# Patient Record
Sex: FEMALE | Race: WHITE | NOT HISPANIC OR LATINO | Employment: FULL TIME | URBAN - METROPOLITAN AREA
[De-identification: names, ages, dates, MRNs, and addresses within clinical notes are randomized per-mention and may not be internally consistent; named-entity substitution may affect disease eponyms.]

---

## 2020-09-30 ENCOUNTER — OFFICE VISIT - HEALTHEAST (OUTPATIENT)
Dept: FAMILY MEDICINE | Facility: CLINIC | Age: 55
End: 2020-09-30

## 2020-09-30 DIAGNOSIS — S61.210A LACERATION OF RIGHT INDEX FINGER WITHOUT FOREIGN BODY WITHOUT DAMAGE TO NAIL, INITIAL ENCOUNTER: ICD-10-CM

## 2020-09-30 RX ORDER — LEVOTHYROXINE SODIUM 100 UG/1
TABLET ORAL
Status: SHIPPED | COMMUNITY
Start: 2020-05-26

## 2021-05-25 ENCOUNTER — RECORDS - HEALTHEAST (OUTPATIENT)
Dept: ADMINISTRATIVE | Facility: CLINIC | Age: 56
End: 2021-05-25

## 2021-05-27 VITALS
OXYGEN SATURATION: 97 % | HEART RATE: 87 BPM | DIASTOLIC BLOOD PRESSURE: 82 MMHG | RESPIRATION RATE: 10 BRPM | SYSTOLIC BLOOD PRESSURE: 122 MMHG

## 2021-06-18 NOTE — PATIENT INSTRUCTIONS - HE
Patient Instructions by Chaya Mujica PA-C at 9/30/2020  7:40 AM     Author: Chaya Mujica PA-C Service: -- Author Type: Physician Assistant    Filed: 9/30/2020  7:52 AM Encounter Date: 9/30/2020 Status: Signed    : Chaya Mujica PA-C (Physician Assistant)       Patient Education     Extremity Laceration: Stitches, Staples, or Tape  A laceration is a cut through the skin. If it is deep, it may require stitches or staples to close so it can heal. Minor cuts may be treated with surgical tape closures, or skin glue.  X-rays may be done if something may have entered the skin through the cut. You may also need a tetanus shot if you are not up to date on this vaccine.  Home care    Follow the healthcare providers instructions on how to care for the cut.    Wash your hands with soap and warm water before and after caring for your wound. This is to help prevent infection.    Keep the wound clean and dry. If a bandage was applied and it becomes wet or dirty, replace it. Otherwise, leave it in place for the first 24 hours, then change it once a day or as directed.    If stitches or staples were used, clean the wound daily:  ? After removing the bandage, wash the area with soap and water. Use a wet cotton swab to loosen and remove any blood or crust that forms.  ? After cleaning, keep the wound clean and dry. Talk with your healthcare provider before putting any antibiotic ointment on the wound. Reapply the bandage.    You may remove the bandage to shower as usual after the first 24 hours, but don't soak the area in water (no swimming) until the stitches or staples are removed.    If surgical tape closures were used, keep the area clean and dry. If it becomes wet, blot it dry with a towel. Let the surgical tape fall off on its own.    The healthcare provider may prescribe an antibiotic cream or ointment to prevent infection. He or she may also prescribe an antibiotic pill. Don't stop  taking this medicine until you have finished it all or the provider tells you to stop.    The provider may also prescribe medicine for pain. Follow the instructions for taking these medicines.    Don't do activities that may reopen your wound.  Follow-up care  Follow up with your healthcare provider, or as advised. Most skin wounds heal within 10 days. But an infection may sometimes occur even with proper treatment. Check the wound daily for the signs of infection listed below. Stitches and staples should be removed within 7 to14 days. If surgical tape closures were used, you may remove them after 10 days if they have not fallen off by then.   When to seek medical advice  Call your healthcare provider right away if any of these occur:    Wound bleeding not controlled by direct pressure    Signs of infection, including increasing pain in the wound, increasing wound redness or swelling, or pus or bad odor coming from the wound    Fever of 100.4 F (38 C) or higher, or as directed by your healthcare provider    Stitches or staples come apart or fall out or surgical tape falls off before 7 days    Wound edges reopen    Wound changes colors    Numbness occurs around the wound     Decreased movement around the injured area  Date Last Reviewed: 7/1/2017 2000-2017 [employer]. [address] [place] [address], [place] [address]. All rights reserved. This information is not intended as a substitute for professional medical care. Always follow your healthcare professional's instructions.

## 2021-06-29 NOTE — PROGRESS NOTES
Progress Notes by Chaya Mujica PA-C at 9/30/2020  7:40 AM     Author: Chaya Mujica PA-C Service: -- Author Type: Physician Assistant    Filed: 9/30/2020  8:19 AM Encounter Date: 9/30/2020 Status: Signed    : Chyaa Mujica PA-C (Physician Assistant)       Chief Complaint   Patient presents with   ? Laceration     patient cut finger this morning R hand pointer finger side of knuckle      HPI:  Sylwia Phelps is a 55 y.o. female who complains of laceration to R index finger onset 30 mins ago. Pt dropped a glass which broke into 3 large pieces and when she tried to catch it she cut her finger on the edge of one of the pieces. She denies the glass breaking into smaller pieces. Symptoms are moderate in severity & constant in duration. Denies fever/chills, purulent drainage, warmth, erythema, motor deficit, numbness/tingling, or any other symptoms. Last tetanus immunization unknown.     Problem List:  There are no relevant problems documented for this patient.    No past medical history on file.  Social History     Tobacco Use   ? Smoking status: Never Smoker   ? Smokeless tobacco: Never Used   Substance Use Topics   ? Alcohol use: Not on file     Review of Systems   Constitutional: Negative for chills and fever.   Respiratory: Negative for shortness of breath.    Cardiovascular: Negative for chest pain.   Gastrointestinal: Negative for abdominal pain, diarrhea, nausea and vomiting.   Skin: Positive for wound. Negative for rash.   All other systems reviewed and are negative.    Vitals:    09/30/20 0738   BP: 122/82   Pulse: 87   Resp: 10   SpO2: 97%     Physical Exam   Constitutional: She appears well-developed and well-nourished.  Non-toxic appearance.   HENT:   Head: Normocephalic and atraumatic.   Cardiovascular: Normal rate, regular rhythm and normal heart sounds.   Pulses:       Radial pulses are 2+ on the left side.   Pulmonary/Chest: Effort normal and breath sounds  normal.   Musculoskeletal:      Right hand: She exhibits tenderness (wound only) and laceration. She exhibits normal range of motion and no swelling.        Hands:    Neurological: She is alert.   Skin: Skin is warm and dry. Laceration noted.   Psychiatric: She has a normal mood and affect.     Labs:  No results found for this or any previous visit (from the past 24 hour(s)).    Radiology:  No results found.    Clinical Decision Making:  LACERATION REPAIR:   Laceration LOCATION: R index finger  Size of laceration: 2.5 centimeters  Tendon function, sensation intact. Good pulses, cap refill intact. Good range of motion.  Wound clean. No Foreign body noted.   Oral consent received. Patient aware of risks which include but are not limited to infection, bleeding, iatrogenic injury. Alternative treatment plan was also discussed.  The wound area was irrigated with sterile saline, cleansed with povidone iodine and draped in a sterile fashion.  The wound area was anesthetized with Lidocaine 2% without epinephrine without added sodium bicarbonate.  The wound was repaired with 4-0 Nylon; 5 sutures were used.  Wound care discussed.  Tetanus immunization given.  Suture removal in 10 days.    Dressing applied. Patient tolerated procedure well. Splint not indicated as laceration does not cross over any joints.   Advised to watch for signs or symptoms of infection. If with any concerns of infection advised to come in immediately to be reassessed.         At the end of the encounter, I discussed results, diagnosis, medications. Discussed red flags for immediate return to clinic/ER, as well as indications for follow up if no improvement. Patient understood and agreed to plan. Patient was stable for discharge.  1. Laceration of right index finger without foreign body without damage to nail, initial encounter  Tdap vaccine,  8yo or older,  IM    Laceration repair     Return in about 10 days (around 10/10/2020) for suture  removal.  ?  AYALA MontielA, PAChioC  [facility]

## 2023-08-16 ENCOUNTER — APPOINTMENT (OUTPATIENT)
Dept: RADIOLOGY | Facility: HOSPITAL | Age: 58
End: 2023-08-16
Attending: EMERGENCY MEDICINE
Payer: COMMERCIAL

## 2023-08-16 ENCOUNTER — HOSPITAL ENCOUNTER (EMERGENCY)
Facility: HOSPITAL | Age: 58
Discharge: HOME | End: 2023-08-16
Attending: EMERGENCY MEDICINE | Admitting: EMERGENCY MEDICINE
Payer: COMMERCIAL

## 2023-08-16 VITALS
SYSTOLIC BLOOD PRESSURE: 123 MMHG | OXYGEN SATURATION: 100 % | TEMPERATURE: 98.2 F | RESPIRATION RATE: 18 BRPM | HEART RATE: 72 BPM | DIASTOLIC BLOOD PRESSURE: 82 MMHG

## 2023-08-16 DIAGNOSIS — R07.9 CHEST PAIN, UNSPECIFIED TYPE: ICD-10-CM

## 2023-08-16 LAB
ALBUMIN SERPL BCG-MCNC: 4.8 G/DL (ref 3.5–5.2)
ALP SERPL-CCNC: 43 U/L (ref 35–104)
ALT SERPL W P-5'-P-CCNC: 15 U/L (ref 0–50)
ANION GAP SERPL CALCULATED.3IONS-SCNC: 12 MMOL/L (ref 7–15)
AST SERPL W P-5'-P-CCNC: 26 U/L (ref 0–45)
BASOPHILS # BLD AUTO: 0.1 10E3/UL (ref 0–0.2)
BASOPHILS NFR BLD AUTO: 1 %
BILIRUB DIRECT SERPL-MCNC: <0.2 MG/DL (ref 0–0.3)
BILIRUB SERPL-MCNC: 0.5 MG/DL
BUN SERPL-MCNC: 11.4 MG/DL (ref 6–20)
CALCIUM SERPL-MCNC: 10 MG/DL (ref 8.6–10)
CHLORIDE SERPL-SCNC: 105 MMOL/L (ref 98–107)
CREAT SERPL-MCNC: 0.94 MG/DL (ref 0.51–0.95)
DEPRECATED HCO3 PLAS-SCNC: 24 MMOL/L (ref 22–29)
EOSINOPHIL # BLD AUTO: 0.1 10E3/UL (ref 0–0.7)
EOSINOPHIL NFR BLD AUTO: 1 %
ERYTHROCYTE [DISTWIDTH] IN BLOOD BY AUTOMATED COUNT: 12.9 % (ref 10–15)
GFR SERPL CREATININE-BSD FRML MDRD: 71 ML/MIN/1.73M2
GLUCOSE SERPL-MCNC: 100 MG/DL (ref 70–99)
HCT VFR BLD AUTO: 40.3 % (ref 35–47)
HGB BLD-MCNC: 13.1 G/DL (ref 11.7–15.7)
HOLD SPECIMEN: NORMAL
IMM GRANULOCYTES # BLD: 0 10E3/UL
IMM GRANULOCYTES NFR BLD: 0 %
LIPASE SERPL-CCNC: 22 U/L (ref 13–60)
LYMPHOCYTES # BLD AUTO: 1.4 10E3/UL (ref 0.8–5.3)
LYMPHOCYTES NFR BLD AUTO: 19 %
MAGNESIUM SERPL-MCNC: 2 MG/DL (ref 1.7–2.3)
MCH RBC QN AUTO: 29.6 PG (ref 26.5–33)
MCHC RBC AUTO-ENTMCNC: 32.5 G/DL (ref 31.5–36.5)
MCV RBC AUTO: 91 FL (ref 78–100)
MONOCYTES # BLD AUTO: 0.4 10E3/UL (ref 0–1.3)
MONOCYTES NFR BLD AUTO: 5 %
NEUTROPHILS # BLD AUTO: 5.2 10E3/UL (ref 1.6–8.3)
NEUTROPHILS NFR BLD AUTO: 74 %
NRBC # BLD AUTO: 0 10E3/UL
NRBC BLD AUTO-RTO: 0 /100
PLATELET # BLD AUTO: 393 10E3/UL (ref 150–450)
POTASSIUM SERPL-SCNC: 4.1 MMOL/L (ref 3.4–5.3)
PROT SERPL-MCNC: 7.8 G/DL (ref 6.4–8.3)
RBC # BLD AUTO: 4.43 10E6/UL (ref 3.8–5.2)
SODIUM SERPL-SCNC: 141 MMOL/L (ref 136–145)
TROPONIN T SERPL HS-MCNC: 6 NG/L
WBC # BLD AUTO: 7.1 10E3/UL (ref 4–11)

## 2023-08-16 PROCEDURE — 84484 ASSAY OF TROPONIN QUANT: CPT | Performed by: EMERGENCY MEDICINE

## 2023-08-16 PROCEDURE — 85025 COMPLETE CBC W/AUTO DIFF WBC: CPT | Performed by: EMERGENCY MEDICINE

## 2023-08-16 PROCEDURE — 96375 TX/PRO/DX INJ NEW DRUG ADDON: CPT

## 2023-08-16 PROCEDURE — 93005 ELECTROCARDIOGRAM TRACING: CPT | Performed by: EMERGENCY MEDICINE

## 2023-08-16 PROCEDURE — 93005 ELECTROCARDIOGRAM TRACING: CPT | Performed by: STUDENT IN AN ORGANIZED HEALTH CARE EDUCATION/TRAINING PROGRAM

## 2023-08-16 PROCEDURE — 99285 EMERGENCY DEPT VISIT HI MDM: CPT | Mod: 25

## 2023-08-16 PROCEDURE — 250N000011 HC RX IP 250 OP 636: Mod: JZ | Performed by: EMERGENCY MEDICINE

## 2023-08-16 PROCEDURE — 83735 ASSAY OF MAGNESIUM: CPT | Performed by: EMERGENCY MEDICINE

## 2023-08-16 PROCEDURE — 36415 COLL VENOUS BLD VENIPUNCTURE: CPT | Performed by: EMERGENCY MEDICINE

## 2023-08-16 PROCEDURE — 96374 THER/PROPH/DIAG INJ IV PUSH: CPT

## 2023-08-16 PROCEDURE — 82248 BILIRUBIN DIRECT: CPT | Performed by: EMERGENCY MEDICINE

## 2023-08-16 PROCEDURE — 83690 ASSAY OF LIPASE: CPT | Performed by: EMERGENCY MEDICINE

## 2023-08-16 PROCEDURE — 71045 X-RAY EXAM CHEST 1 VIEW: CPT

## 2023-08-16 PROCEDURE — 96361 HYDRATE IV INFUSION ADD-ON: CPT

## 2023-08-16 PROCEDURE — 258N000003 HC RX IP 258 OP 636: Performed by: EMERGENCY MEDICINE

## 2023-08-16 PROCEDURE — 80053 COMPREHEN METABOLIC PANEL: CPT | Performed by: EMERGENCY MEDICINE

## 2023-08-16 RX ORDER — KETOROLAC TROMETHAMINE 15 MG/ML
15 INJECTION, SOLUTION INTRAMUSCULAR; INTRAVENOUS ONCE
Status: COMPLETED | OUTPATIENT
Start: 2023-08-16 | End: 2023-08-16

## 2023-08-16 RX ORDER — NAPROXEN 500 MG/1
500 TABLET ORAL 2 TIMES DAILY WITH MEALS
Qty: 14 TABLET | Refills: 0 | Status: SHIPPED | OUTPATIENT
Start: 2023-08-16 | End: 2023-08-23

## 2023-08-16 RX ORDER — LIDOCAINE 50 MG/G
1 PATCH TOPICAL EVERY 24 HOURS
Qty: 10 PATCH | Refills: 0 | Status: SHIPPED | OUTPATIENT
Start: 2023-08-16 | End: 2023-08-26

## 2023-08-16 RX ORDER — ONDANSETRON 2 MG/ML
4 INJECTION INTRAMUSCULAR; INTRAVENOUS
Status: COMPLETED | OUTPATIENT
Start: 2023-08-16 | End: 2023-08-16

## 2023-08-16 RX ADMIN — SODIUM CHLORIDE 500 ML: 9 INJECTION, SOLUTION INTRAVENOUS at 12:40

## 2023-08-16 RX ADMIN — ONDANSETRON 4 MG: 2 INJECTION INTRAMUSCULAR; INTRAVENOUS at 12:40

## 2023-08-16 RX ADMIN — KETOROLAC TROMETHAMINE 15 MG: 15 INJECTION, SOLUTION INTRAMUSCULAR; INTRAVENOUS at 12:39

## 2023-08-16 ASSESSMENT — ACTIVITIES OF DAILY LIVING (ADL): ADLS_ACUITY_SCORE: 35

## 2023-08-16 NOTE — DISCHARGE INSTRUCTIONS
Follow up with rapid access cardiology clinic - they will call you for appointment.  Take naproxen twice a day with food and lidoderm patches daily as needed. Follow up with primary clinic if not improving over the next few weeks.

## 2023-08-16 NOTE — ED PROVIDER NOTES
Emergency Department Encounter     Evaluation Date & Time:   8/16/2023 11:51 AM    CHIEF COMPLAINT:  Chest Pain      Triage Note:      Pt with c/o chest pain that started yesterday.  Pt reports pain continued today while at work.  Left sided cp that radiates to left arm and describes as achy/gnawing pain.  Some nausea.                ED COURSE & MEDICAL DECISION MAKING:     ED Course as of 08/16/23 1449   Wed Aug 16, 2023   1155 Met with patient for initial interview and exam. Her friend is at bedside.   1325 hsTrop 6, which is below gender reference range with symptoms ongoing for past 2 days, ACS ruled out per protocol. Rest of labs all reassuring as well.   1333 CXR (independent interpretation): no acute cardiopulmonary process    1352 Updated patient.  Re-evaluated, reassured by workup. Discussed nsdaids, lidoderm patch. Rx for both.  Encouraged follow up if not improving over the next few weeks and will refer to rapid access cardiology given age and symptoms.         Pt here with left sided chest pressure/achiness past 2 days, constant with waxing/waning severity. Reports associated lightheadedness, HA, nausea. Denies dyspnea, syncope, leg pain/swelling or prior DVT/PE history.  Pt with no cardiac history.  She has some reproducible tenderness left axilla where pain radiates, but no palpable mass or rash seen.  EKG unremarkable.  Will get labs, CXR, reassess.  Intact pulses, no real suspicion for aortic pathology or PE and will not get CTA chest at this time.      Medical Decision Making    History:  Supplemental history from: Documented in chart, if applicable  External Record(s) reviewed: Outpatient Record: 03/13/2023 Office Visit    Work Up:  Chart documentation includes differential considered and any EKGs or imaging independently interpreted by provider, where specified.  In additional to work up documented, I considered the following work up: Documented in chart, if applicable.    External  consultation:  Discussion of management with another provider: Documented in chart, if applicable    Complicating factors:  Care impacted by chronic illness: Other: Hypothyroidism  Care affected by social determinants of health: N/A    Disposition considerations: Discharge. I prescribed additional prescription strength medication(s) as charted. See documentation for any additional details.      At the conclusion of the encounter I discussed the results of all the tests and the disposition. The questions were answered. The patient or family acknowledged understanding and was agreeable with the care plan.      MEDICATIONS GIVEN IN THE EMERGENCY DEPARTMENT:  Medications   ketorolac (TORADOL) injection 15 mg (15 mg Intravenous $Given 8/16/23 1239)   ondansetron (ZOFRAN) injection 4 mg (4 mg Intravenous $Given 8/16/23 1240)   0.9% sodium chloride BOLUS (0 mLs Intravenous Stopped 8/16/23 1402)       NEW PRESCRIPTIONS STARTED AT TODAY'S ED VISIT:  Discharge Medication List as of 8/16/2023  2:03 PM        START taking these medications    Details   lidocaine (LIDODERM) 5 % patch Place 1 patch onto the skin every 24 hours for 10 days Apply to area of painDisp-10 patch, [id number]      naproxen (NAPROSYN) 500 MG tablet Take 1 tablet (500 mg) by mouth 2 times daily (with meals) for 7 days, Disp-14 tablet, R-0, E-Prescribe             HPI   The history is provided by the patient and a friend. No  was used.        Sylwia Phelps is a 58 year old female with a pertinent history of hypothyroidism who presents to this ED by walk in for evaluation of chest pain.    Per chart review, the patient was seen for an Office Visit on 03/13/2023 for evaluation of neck pain and occipital pain. The pain was in her right lateral neck and radiated up and to the left side. She was encouraged to schedule imaging, which showed mild to moderate midline C5-C6 and C6-C7 spinal canal stenosis, moderate to severe right C5-C6  "neural foraminal stenosis, mild C5-C6 and C6-C7 degenerative disc disease.     The patient reports left-sided chest pain for the past couple days that is \"gnawing\" and radiates into her armpit. She endorses a \"squeezing\" headache, lightheadedness, and nausea. The nausea is intermittent and is associated with the lightheadedness. The patient reports some jaw ache on her left side and pain down her upper left arm. She states that nothing alleviates or provokes the pain. It gets worse and better, but does not fully go away.    She denies shortness of breath, vomiting, abdominal pain, cough, and fever. She denies recent surgery or travel, chance of pregnancy, and any history of blood clot.    REVIEW OF SYSTEMS:  See HPI      Medical History   No past medical history on file.    No past surgical history on file.    No family history on file.    Social History     Tobacco Use    Smoking status: Never    Smokeless tobacco: Never       lidocaine (LIDODERM) 5 % patch  naproxen (NAPROSYN) 500 MG tablet  levothyroxine (SYNTHROID) 100 MCG tablet        Physical Exam     Vitals:  /82   Pulse 72   Temp 98.2  F (36.8  C)   Resp 18   SpO2 100%     PHYSICAL EXAM:   Physical Exam  Vitals and nursing note reviewed.   Constitutional:       General: She is not in acute distress.     Appearance: Normal appearance.   HENT:      Head: Normocephalic and atraumatic.      Nose: Nose normal.      Mouth/Throat:      Mouth: Mucous membranes are moist.   Eyes:      Pupils: Pupils are equal, round, and reactive to light.   Neck:      Vascular: No JVD.   Cardiovascular:      Rate and Rhythm: Normal rate and regular rhythm.      Pulses: Normal pulses.           Radial pulses are 2+ on the right side and 2+ on the left side.        Dorsalis pedis pulses are 2+ on the right side and 2+ on the left side.   Pulmonary:      Effort: Pulmonary effort is normal. No respiratory distress.      Breath sounds: Normal breath sounds.   Chest:      Chest " wall: No tenderness.   Abdominal:      Palpations: Abdomen is soft.      Tenderness: There is no abdominal tenderness.   Musculoskeletal:      Cervical back: Full passive range of motion without pain and neck supple.      Comments: No calf tenderness or swelling b/l  Mild left axillary tenderness, no palpable mass.    Skin:     General: Skin is warm.      Findings: No rash (to chest wall or left axilla).   Neurological:      General: No focal deficit present.      Mental Status: She is alert. Mental status is at baseline.      Comments: Fluent speech, no acute lateralizing deficits   Psychiatric:         Mood and Affect: Mood normal.         Behavior: Behavior normal.         Results     LAB:  All pertinent labs reviewed and interpreted  Labs Ordered and Resulted from Time of ED Arrival to Time of ED Departure   BASIC METABOLIC PANEL - Abnormal       Result Value    Sodium 141      Potassium 4.1      Chloride 105      Carbon Dioxide (CO2) 24      Anion Gap 12      Urea Nitrogen 11.4      Creatinine 0.94      Calcium 10.0      Glucose 100 (*)     GFR Estimate 71     HEPATIC FUNCTION PANEL - Normal    Protein Total 7.8      Albumin 4.8      Bilirubin Total 0.5      Alkaline Phosphatase 43      AST 26      ALT 15      Bilirubin Direct <0.20     LIPASE - Normal    Lipase 22     TROPONIN T, HIGH SENSITIVITY - Normal    Troponin T, High Sensitivity 6     MAGNESIUM - Normal    Magnesium 2.0     CBC WITH PLATELETS AND DIFFERENTIAL    WBC Count 7.1      RBC Count 4.43      Hemoglobin 13.1      Hematocrit 40.3      MCV 91      MCH 29.6      MCHC 32.5      RDW 12.9      Platelet Count 393      % Neutrophils 74      % Lymphocytes 19      % Monocytes 5      % Eosinophils 1      % Basophils 1      % Immature Granulocytes 0      NRBCs per 100 WBC 0      Absolute Neutrophils 5.2      Absolute Lymphocytes 1.4      Absolute Monocytes 0.4      Absolute Eosinophils 0.1      Absolute Basophils 0.1      Absolute Immature Granulocytes 0.0       Absolute NRBCs 0.0         RADIOLOGY:  XR Chest Port 1 View   Final Result   IMPRESSION: Negative chest.                   ECG:  NSR, rate 78, normal intervals, no acute ischemia    I have independently reviewed and interpreted the EKG(s) documented above     PROCEDURES:  Procedures:  None      FINAL IMPRESSION:    ICD-10-CM    1. Chest pain, unspecified type  R07.9 [facility]  Referral          0 minutes of critical care time      I, Zeeshan Ortiz, am serving as a scribe to document services personally performed by Dr. Kenneth Bronson, based on my observations and the provider's statements to me. I, Kenneth Bronson, DO attest that Zeeshan Ortiz is acting in a scribe capacity, has observed my performance of the services and has documented them in accordance with my direction.      Kenneth Bronson DO  Emergency Medicine  [facility] EMERGENCY DEPARTMENT  8/16/2023  11:53 AM          Kenneth Bronson MD  08/16/23 5026

## 2023-08-16 NOTE — ED NOTES
Pt presents for evaluation of CP with radiation down her L arm, as well as head pressure which has been ongoing since yesterday. No reported hx of similar episodes.   SKIN: WNL.   HEENT: Normocephalic, alert and oriented x 3.   CHEST: Symmetrical rise. No SOB. L sided chest discomfort.   ABD: No reported vomiting. Pt endorses some nausea.   EXT: BUI x 4  Rest of exam unremarkable.

## 2023-08-16 NOTE — ED TRIAGE NOTES
Pt with c/o chest pain that started yesterday.  Pt reports pain continued today while at work.  Left sided cp that radiates to left arm and describes as achy/gnawing pain.  Some nausea.

## 2023-08-21 LAB
ATRIAL RATE - MUSE: 78 BPM
DIASTOLIC BLOOD PRESSURE - MUSE: NORMAL MMHG
INTERPRETATION ECG - MUSE: NORMAL
P AXIS - MUSE: 10 DEGREES
PR INTERVAL - MUSE: 126 MS
QRS DURATION - MUSE: 80 MS
QT - MUSE: 404 MS
QTC - MUSE: 460 MS
R AXIS - MUSE: 29 DEGREES
SYSTOLIC BLOOD PRESSURE - MUSE: NORMAL MMHG
T AXIS - MUSE: 15 DEGREES
VENTRICULAR RATE- MUSE: 78 BPM

## 2024-12-18 ENCOUNTER — HOSPITAL ENCOUNTER (EMERGENCY)
Facility: HOSPITAL | Age: 59
Discharge: HOME | End: 2024-12-18
Attending: EMERGENCY MEDICINE | Admitting: EMERGENCY MEDICINE
Payer: COMMERCIAL

## 2024-12-18 VITALS
RESPIRATION RATE: 16 BRPM | TEMPERATURE: 98.3 F | HEART RATE: 71 BPM | OXYGEN SATURATION: 100 % | WEIGHT: 227.07 LBS | SYSTOLIC BLOOD PRESSURE: 124 MMHG | DIASTOLIC BLOOD PRESSURE: 61 MMHG

## 2024-12-18 DIAGNOSIS — R07.9 CHEST PAIN, UNSPECIFIED TYPE: ICD-10-CM

## 2024-12-18 DIAGNOSIS — R20.2 PARESTHESIAS: ICD-10-CM

## 2024-12-18 LAB
ANION GAP SERPL CALCULATED.3IONS-SCNC: 12 MMOL/L (ref 7–15)
BASOPHILS # BLD AUTO: 0.1 10E3/UL (ref 0–0.2)
BASOPHILS NFR BLD AUTO: 1 %
BUN SERPL-MCNC: 13.4 MG/DL (ref 6–20)
CALCIUM SERPL-MCNC: 9.9 MG/DL (ref 8.8–10.4)
CHLORIDE SERPL-SCNC: 106 MMOL/L (ref 98–107)
CREAT SERPL-MCNC: 0.83 MG/DL (ref 0.51–0.95)
D DIMER PPP FEU-MCNC: 1.34 UG/ML FEU (ref 0–0.5)
EGFRCR SERPLBLD CKD-EPI 2021: 82 ML/MIN/1.73M2
EOSINOPHIL # BLD AUTO: 0.2 10E3/UL (ref 0–0.7)
EOSINOPHIL NFR BLD AUTO: 2 %
ERYTHROCYTE [DISTWIDTH] IN BLOOD BY AUTOMATED COUNT: 12.7 % (ref 10–15)
GLUCOSE SERPL-MCNC: 100 MG/DL (ref 70–99)
HCO3 SERPL-SCNC: 24 MMOL/L (ref 22–29)
HCT VFR BLD AUTO: 41.2 % (ref 35–47)
HGB BLD-MCNC: 13.4 G/DL (ref 11.7–15.7)
IMM GRANULOCYTES # BLD: 0 10E3/UL
IMM GRANULOCYTES NFR BLD: 0 %
LYMPHOCYTES # BLD AUTO: 2.3 10E3/UL (ref 0.8–5.3)
LYMPHOCYTES NFR BLD AUTO: 25 %
MCH RBC QN AUTO: 28.8 PG (ref 26.5–33)
MCHC RBC AUTO-ENTMCNC: 32.5 G/DL (ref 31.5–36.5)
MCV RBC AUTO: 88 FL (ref 78–100)
MONOCYTES # BLD AUTO: 0.5 10E3/UL (ref 0–1.3)
MONOCYTES NFR BLD AUTO: 6 %
NEUTROPHILS # BLD AUTO: 6 10E3/UL (ref 1.6–8.3)
NEUTROPHILS NFR BLD AUTO: 66 %
NRBC # BLD AUTO: 0 10E3/UL
NRBC BLD AUTO-RTO: 0 /100
PLATELET # BLD AUTO: 401 10E3/UL (ref 150–450)
POTASSIUM SERPL-SCNC: 3.8 MMOL/L (ref 3.4–5.3)
RBC # BLD AUTO: 4.66 10E6/UL (ref 3.8–5.2)
SODIUM SERPL-SCNC: 142 MMOL/L (ref 135–145)
TROPONIN T SERPL HS-MCNC: 6 NG/L
WBC # BLD AUTO: 9 10E3/UL (ref 4–11)

## 2024-12-18 PROCEDURE — 82565 ASSAY OF CREATININE: CPT | Performed by: EMERGENCY MEDICINE

## 2024-12-18 PROCEDURE — A9585 GADOBUTROL INJECTION: HCPCS | Performed by: EMERGENCY MEDICINE

## 2024-12-18 PROCEDURE — 84484 ASSAY OF TROPONIN QUANT: CPT | Performed by: EMERGENCY MEDICINE

## 2024-12-18 PROCEDURE — 85025 COMPLETE CBC W/AUTO DIFF WBC: CPT | Performed by: EMERGENCY MEDICINE

## 2024-12-18 PROCEDURE — 36415 COLL VENOUS BLD VENIPUNCTURE: CPT | Performed by: EMERGENCY MEDICINE

## 2024-12-18 PROCEDURE — 99285 EMERGENCY DEPT VISIT HI MDM: CPT | Mod: 25

## 2024-12-18 PROCEDURE — 80048 BASIC METABOLIC PNL TOTAL CA: CPT | Performed by: EMERGENCY MEDICINE

## 2024-12-18 PROCEDURE — 85379 FIBRIN DEGRADATION QUANT: CPT | Performed by: EMERGENCY MEDICINE

## 2024-12-18 PROCEDURE — 255N000002 HC RX 255 OP 636: Performed by: EMERGENCY MEDICINE

## 2024-12-18 PROCEDURE — 93005 ELECTROCARDIOGRAM TRACING: CPT | Performed by: STUDENT IN AN ORGANIZED HEALTH CARE EDUCATION/TRAINING PROGRAM

## 2024-12-18 PROCEDURE — 250N000011 HC RX IP 250 OP 636: Performed by: EMERGENCY MEDICINE

## 2024-12-18 PROCEDURE — 93005 ELECTROCARDIOGRAM TRACING: CPT | Performed by: EMERGENCY MEDICINE

## 2024-12-18 RX ORDER — GADOBUTROL 604.72 MG/ML
10 INJECTION INTRAVENOUS ONCE
Status: COMPLETED | OUTPATIENT
Start: 2024-12-18 | End: 2024-12-18

## 2024-12-18 RX ORDER — IOPAMIDOL 755 MG/ML
90 INJECTION, SOLUTION INTRAVASCULAR ONCE
Status: COMPLETED | OUTPATIENT
Start: 2024-12-18 | End: 2024-12-18

## 2024-12-18 RX ADMIN — GADOBUTROL 10 ML: 604.72 INJECTION INTRAVENOUS at 18:28

## 2024-12-18 RX ADMIN — IOPAMIDOL 90 ML: 755 INJECTION, SOLUTION INTRAVENOUS at 19:19

## 2024-12-18 ASSESSMENT — COLUMBIA-SUICIDE SEVERITY RATING SCALE - C-SSRS
1. IN THE PAST MONTH, HAVE YOU WISHED YOU WERE DEAD OR WISHED YOU COULD GO TO SLEEP AND NOT WAKE UP?: NO
6. HAVE YOU EVER DONE ANYTHING, STARTED TO DO ANYTHING, OR PREPARED TO DO ANYTHING TO END YOUR LIFE?: NO
2. HAVE YOU ACTUALLY HAD ANY THOUGHTS OF KILLING YOURSELF IN THE PAST MONTH?: NO

## 2024-12-18 ASSESSMENT — ACTIVITIES OF DAILY LIVING (ADL)
ADLS_ACUITY_SCORE: 41

## 2024-12-18 NOTE — ED TRIAGE NOTES
She has been dealing with chronic chest pain for which she had a stress test earlier this year. She has an appointment for cardiology upcoming. She continues to have the chest pain. She also has developed right leg pain that started about three days ago. She had been traveling in a car for a few days and that is when this new leg pain started. She says it is in her calf area than radiates up into her upper leg. She states it is like a tingling.

## 2024-12-18 NOTE — ED PROVIDER NOTES
EMERGENCY DEPARTMENT ENCOUNTER      NAME: Sylwia Phelps  AGE: 59 year old female  YOB: 1965  MRN: [id number]  EVALUATION DATE & TIME: No admission date for patient encounter.    PCP: Ros Chaudhary    ED PROVIDER: Kristen Wong DO      Chief Complaint   Patient presents with    Chest Pain    Leg Pain         FINAL IMPRESSION:  1. Paresthesias    2. Chest pain, unspecified type          ED COURSE & MEDICAL DECISION MAKING:    Pertinent Labs & Imaging studies reviewed. (See chart for details)  4:14 PM I met the patient and performed my initial interview and exam.  5:44 PM Rechecked and updated patient on lab results.  8:46 PM Rechecked patient. Updated on imaging. We discussed plan for discharge and all questions were answered.    59 year old female presents to the Emergency Department for evaluation of right lower extremity pain, paresthesias, right upper extremity paresthesias.  Noted a few days ago was after a long car ride.  Noted she did have some pain behind her right knee.  That seems to go away.  Now has some tingling sensation at various spots to her right leg and also notes that to her arm.  No weakness.  No confusion.  They have been persistent.  She has been having chest pain over the past month that comes and goes.  Sharp.  Not related to exertion.  EKG here without evidence arrhythmia ischemia.  I do see that patient has had CT calcium score in the past which was 0.  Low suspicion for ACS.  Wells score is low, will screen with a dimer for evaluation of possible DVT or PE.  Given persistent right upper and lower extremity perceived paresthesias, albeit I cannot reproduce on exam, will obtain MRI of the brain to rule out any signs of stroke or space-occupying lesion.  Sounds like she has been having low back pain and certainly the pain that she had in her behind her right knee and paresthesias could be radiculopathy but this would not explain her arm paresthesias.  She denies any  neck pain.  High-sensitivity troponin is 6, I think this is reasonable to rule out ACS given length of time send probably chest pain onset.  D-dimer is elevated, will obtain venous duplex of the right lower extremity and CT imaging of the chest.  CBC, BMP unremarkable.  MRI of the brain obtained and shows no acute abnormality.  Venous duplex and CT of the chest were unremarkable.  I did discuss with patient.  I encouraged her to continue follow-up with orthopedics, especially since the leg pain may be radicular in nature.  Will place a referral to neurology if ongoing paresthesias.  Discussed other symptomatic care for home and return precautions.    At the conclusion of the encounter I discussed the results of all of the tests and the disposition. The questions were answered. The patient or family acknowledged understanding and was agreeable with the care plan.     Medical Decision Making  Obtained supplemental history:Supplemental history obtained?: No  Reviewed external records: External records reviewed?: Documented in chart  Care impacted by chronic illness:Hypertension  Did you consider but not order tests?: Work up considered but not performed and documented in chart, if applicable  Did you interpret images independently?: Independent interpretation of ECG and images noted in documentation, when applicable.  Consultation discussion with other provider:Did you involve another provider (consultant, , pharmacy, etc.)?: No  Discharge. No recommendations on prescription strength medication(s). See documentation for any additional details.    MIPS: CT Pulmonary Angiogram:The patient had an abnormal d-dimer.      MEDICATIONS GIVEN IN THE EMERGENCY:  Medications   gadobutrol (GADAVIST) injection 10 mL (10 mLs Intravenous $Given 12/18/24 1828)   iopamidol (ISOVUE-370) solution 90 mL (90 mLs Intravenous $Given 12/18/24 1919)       NEW PRESCRIPTIONS STARTED AT TODAY'S ER VISIT  Discharge Medication List as of  "12/18/2024  8:48 PM             =================================================================    HPI    Patient information was obtained from: patient    Use of : N/A       Sylwia Phelps is a 59 year old female with a pertinent history of hypertension who presents to this ED via walk-in for evaluation of leg numbness/tingling.    On 15-Dec-2024, the patient was on a car trip and started having numbness/tingling \"under the skin\" to her right lower leg and up into her right medial thigh. This has since been constant. On either 15-Dec or 16-Dec when she was still traveling by car, she noted that when she got out she had sharp, numb right knee pain that later went away and hasn't returned. The numbness has since manifested in her right arm as well, but not in her left leg or arm. Elevating her feet didn't improve the numbness. Today, she had some nausea and presented to the ER due to this unresolved numbness.    The patient has a history of chest pain for the past month that isn't changed. She has an upcoming cardiology appointment in Mar-2025. She had a stress test last Spring-2024 that went well.    The patient has a history of lower back pain, currently unchanged; there is no history of accompanying leg numbness or pain. She notes having neck pain shooting into her head 3-4 months ago with a normal MRI head at that time.     The patient has no new supplements. She denies fever, chills, current neck pain, and rashes.      Per chart review, the patient presented to [facility] on 05-Dec-2024 for evaluation of low back pain. No history of injury specifically related to the back pain. 13 physical therapy visits until 11-Sep-2024, with improvement of her pain. Planned for upcoming MRI lumbar spine for pain.      REVIEW OF SYSTEMS   Per HPI    PAST MEDICAL HISTORY:  History reviewed. No pertinent past medical history.    PAST SURGICAL HISTORY:  History reviewed. No pertinent " surgical history.        CURRENT MEDICATIONS:    levothyroxine (SYNTHROID) 100 MCG tablet         ALLERGIES:  No Known Allergies    FAMILY HISTORY:  History reviewed. No pertinent family history.    SOCIAL HISTORY:   Social History     Socioeconomic History    Marital status:    Tobacco Use    Smoking status: Never    Smokeless tobacco: Never     Social Drivers of Health     Financial Resource Strain: Not At Risk (5/23/2024)    Received from [facility]    Financial Resource Strain     Is it hard for you to pay for the very basics like food, housing, medical care or heating?: No   Food Insecurity: Not At Risk (5/23/2024)    Received from [facility]    Food Insecurity     Does your food run out before you have the money to buy more?: No   Transportation Needs: Not At Risk (5/23/2024)    Received from [facility]    Transportation Needs     Does a lack of transportation keep you from your medical appointments or from getting your medications?: No       VITALS:  /61   Pulse 71   Temp 98.3  F (36.8  C) (Temporal)   Resp 16   Wt 103 kg (227 lb 1.2 oz)   SpO2 100%     PHYSICAL EXAM    Physical Exam  Constitutional:       General: She is not in acute distress.  HENT:      Head: Normocephalic and atraumatic.      Mouth/Throat:      Pharynx: Oropharynx is clear.   Eyes:      Pupils: Pupils are equal, round, and reactive to light.   Cardiovascular:      Rate and Rhythm: Normal rate and regular rhythm.      Pulses: Normal pulses.      Heart sounds: Normal heart sounds.   Pulmonary:      Effort: Pulmonary effort is normal.      Breath sounds: Normal breath sounds.   Abdominal:      General: Abdomen is flat. Bowel sounds are normal.      Palpations: Abdomen is soft.      Tenderness: There is no abdominal tenderness.   Musculoskeletal:         General: Normal range of motion.   Skin:     General: Skin is warm and dry.      Capillary Refill: Capillary refill takes less than 2 seconds.   Neurological:       General: No focal deficit present.      Mental Status: She is alert and oriented to person, place, and time.      Cranial Nerves: Cranial nerves 2-12 are intact.      Sensory: Sensation is intact.      Motor: Motor function is intact.      Coordination: Coordination is intact. Finger-Nose-Finger Test normal.      Gait: Gait is intact.             LAB:  All pertinent labs reviewed and interpreted.  Labs Ordered and Resulted from Time of ED Arrival to Time of ED Departure   BASIC METABOLIC PANEL - Abnormal       Result Value    Sodium 142      Potassium 3.8      Chloride 106      Carbon Dioxide (CO2) 24      Anion Gap 12      Urea Nitrogen 13.4      Creatinine 0.83      GFR Estimate 82      Calcium 9.9      Glucose 100 (*)    D DIMER QUANTITATIVE - Abnormal    D-Dimer Quantitative 1.34 (*)    TROPONIN T, HIGH SENSITIVITY - Normal    Troponin T, High Sensitivity 6     CBC WITH PLATELETS AND DIFFERENTIAL    WBC Count 9.0      RBC Count 4.66      Hemoglobin 13.4      Hematocrit 41.2      MCV 88      MCH 28.8      MCHC 32.5      RDW 12.7      Platelet Count 401      % Neutrophils 66      % Lymphocytes 25      % Monocytes 6      % Eosinophils 2      % Basophils 1      % Immature Granulocytes 0      NRBCs per 100 WBC 0      Absolute Neutrophils 6.0      Absolute Lymphocytes 2.3      Absolute Monocytes 0.5      Absolute Eosinophils 0.2      Absolute Basophils 0.1      Absolute Immature Granulocytes 0.0      Absolute NRBCs 0.0         RADIOLOGY:  Reviewed all pertinent imaging. Please see official radiology report.  CT Chest Pulmonary Embolism w Contrast   Final Result   IMPRESSION:   No pulmonary emboli or other acute finding in the chest.      US Lower Extremity Venous Duplex Right   Final Result   IMPRESSION:   1.  No deep venous thrombosis in the right lower extremity.      MR Brain w/o & w Contrast   Final Result   IMPRESSION:   1.  No acute intracranial abnormality.              EKG:    Performed at:  1232    Impression: Sinus rhythm, short DE, no acute changes compared to EKG 8/16/23    Rate: 69 bpm  Rhythm: sinus  Axis: normal  DE Interval: 104 ms  QRS Interval: 82 ms  QTc Interval: 432 ms  ST Changes: n/a    I have independently reviewed and interpreted the EKG(s) documented above.      I, Jovanni Marin, am serving as a scribe to document services personally performed by Dr. Kristen Wong based on my observation and the provider's statements to me. I, Kristen Wong, DO attest that Jovanni Marin is acting in a scribe capacity, has observed my performance of the services and has documented them in accordance with my direction.    Kristen Wong DO  Emergency Medicine  [facility] EMERGENCY DEPARTMENT  [facility] [facility] [address] [phone]  Dept: [phone]       Kristen Wong DO  12/19/24 0117

## 2024-12-18 NOTE — ED NOTES
Patient noting numbness to RUE and RLE after a 3 day car ride recently. No weakness noted, denies changes to vision, denies pain, denies issues with breathing.

## 2024-12-19 NOTE — DISCHARGE INSTRUCTIONS
As discussed, your testing was reassuring here today  No signs of heart attack, stroke, blood clots or any life-threatening emergency  I would follow-up closely with your primary doctor and with orthopedics to further evaluate your tingling.  As discussed possible the tingling in your leg is from your back  I did place a referral to neurology if you have ongoing symptoms.  [facility] will call you to coordinate your care as prescribed by your provider. If you don't hear from a representative within 2 business days, please call [phone].   Return if any acute worsening of symptoms, weakness, uncontrolled pain or any other concerns

## 2024-12-26 LAB
ATRIAL RATE - MUSE: 69 BPM
DIASTOLIC BLOOD PRESSURE - MUSE: NORMAL MMHG
INTERPRETATION ECG - MUSE: NORMAL
P AXIS - MUSE: 22 DEGREES
PR INTERVAL - MUSE: 104 MS
QRS DURATION - MUSE: 82 MS
QT - MUSE: 404 MS
QTC - MUSE: 432 MS
R AXIS - MUSE: 36 DEGREES
SYSTOLIC BLOOD PRESSURE - MUSE: NORMAL MMHG
T AXIS - MUSE: 25 DEGREES
VENTRICULAR RATE- MUSE: 69 BPM

## 2025-01-25 ENCOUNTER — APPOINTMENT (OUTPATIENT)
Dept: CT IMAGING | Facility: HOSPITAL | Age: 60
End: 2025-01-25
Attending: EMERGENCY MEDICINE
Payer: COMMERCIAL

## 2025-01-25 ENCOUNTER — HOSPITAL ENCOUNTER (EMERGENCY)
Facility: HOSPITAL | Age: 60
Discharge: HOME | End: 2025-01-25
Attending: EMERGENCY MEDICINE | Admitting: EMERGENCY MEDICINE
Payer: COMMERCIAL

## 2025-01-25 VITALS
OXYGEN SATURATION: 97 % | HEART RATE: 76 BPM | HEIGHT: 72 IN | WEIGHT: 225 LBS | SYSTOLIC BLOOD PRESSURE: 108 MMHG | DIASTOLIC BLOOD PRESSURE: 63 MMHG | BODY MASS INDEX: 30.48 KG/M2 | RESPIRATION RATE: 18 BRPM | TEMPERATURE: 97 F

## 2025-01-25 DIAGNOSIS — R10.9 RIGHT FLANK PAIN: ICD-10-CM

## 2025-01-25 LAB
ALBUMIN SERPL BCG-MCNC: 4.9 G/DL (ref 3.5–5.2)
ALBUMIN UR-MCNC: NEGATIVE MG/DL
ALP SERPL-CCNC: 54 U/L (ref 40–150)
ALT SERPL W P-5'-P-CCNC: 17 U/L (ref 0–50)
ANION GAP SERPL CALCULATED.3IONS-SCNC: 12 MMOL/L (ref 7–15)
APPEARANCE UR: CLEAR
AST SERPL W P-5'-P-CCNC: 19 U/L (ref 0–45)
BASOPHILS # BLD AUTO: 0.1 10E3/UL (ref 0–0.2)
BASOPHILS NFR BLD AUTO: 1 %
BILIRUB DIRECT SERPL-MCNC: <0.2 MG/DL (ref 0–0.3)
BILIRUB SERPL-MCNC: 0.7 MG/DL
BILIRUB UR QL STRIP: NEGATIVE
BUN SERPL-MCNC: 19.1 MG/DL (ref 6–20)
CALCIUM SERPL-MCNC: 10.5 MG/DL (ref 8.8–10.4)
CHLORIDE SERPL-SCNC: 104 MMOL/L (ref 98–107)
COLOR UR AUTO: YELLOW
CREAT SERPL-MCNC: 0.95 MG/DL (ref 0.51–0.95)
EGFRCR SERPLBLD CKD-EPI 2021: 70 ML/MIN/1.73M2
EOSINOPHIL # BLD AUTO: 0.2 10E3/UL (ref 0–0.7)
EOSINOPHIL NFR BLD AUTO: 3 %
ERYTHROCYTE [DISTWIDTH] IN BLOOD BY AUTOMATED COUNT: 12.6 % (ref 10–15)
GLUCOSE SERPL-MCNC: 92 MG/DL (ref 70–99)
GLUCOSE UR STRIP-MCNC: NEGATIVE MG/DL
HCO3 SERPL-SCNC: 25 MMOL/L (ref 22–29)
HCT VFR BLD AUTO: 42.8 % (ref 35–47)
HGB BLD-MCNC: 14.2 G/DL (ref 11.7–15.7)
HGB UR QL STRIP: NEGATIVE
IMM GRANULOCYTES # BLD: 0 10E3/UL
IMM GRANULOCYTES NFR BLD: 0 %
KETONES UR STRIP-MCNC: 10 MG/DL
LEUKOCYTE ESTERASE UR QL STRIP: NEGATIVE
LIPASE SERPL-CCNC: 34 U/L (ref 13–60)
LYMPHOCYTES # BLD AUTO: 2.4 10E3/UL (ref 0.8–5.3)
LYMPHOCYTES NFR BLD AUTO: 27 %
MCH RBC QN AUTO: 29.1 PG (ref 26.5–33)
MCHC RBC AUTO-ENTMCNC: 33.2 G/DL (ref 31.5–36.5)
MCV RBC AUTO: 88 FL (ref 78–100)
MONOCYTES # BLD AUTO: 0.6 10E3/UL (ref 0–1.3)
MONOCYTES NFR BLD AUTO: 7 %
MUCOUS THREADS #/AREA URNS LPF: PRESENT /LPF
NEUTROPHILS # BLD AUTO: 5.7 10E3/UL (ref 1.6–8.3)
NEUTROPHILS NFR BLD AUTO: 63 %
NITRATE UR QL: NEGATIVE
NRBC # BLD AUTO: 0 10E3/UL
NRBC BLD AUTO-RTO: 0 /100
PH UR STRIP: 5.5 [PH] (ref 5–7)
PLATELET # BLD AUTO: 456 10E3/UL (ref 150–450)
POTASSIUM SERPL-SCNC: 4.2 MMOL/L (ref 3.4–5.3)
PROT SERPL-MCNC: 8 G/DL (ref 6.4–8.3)
RBC # BLD AUTO: 4.88 10E6/UL (ref 3.8–5.2)
RBC URINE: 1 /HPF
SODIUM SERPL-SCNC: 141 MMOL/L (ref 135–145)
SP GR UR STRIP: 1.02 (ref 1–1.03)
SQUAMOUS EPITHELIAL: <1 /HPF
UROBILINOGEN UR STRIP-MCNC: <2 MG/DL
WBC # BLD AUTO: 9.1 10E3/UL (ref 4–11)
WBC URINE: 4 /HPF

## 2025-01-25 PROCEDURE — 36415 COLL VENOUS BLD VENIPUNCTURE: CPT | Performed by: EMERGENCY MEDICINE

## 2025-01-25 PROCEDURE — 250N000011 HC RX IP 250 OP 636: Mod: JZ | Performed by: EMERGENCY MEDICINE

## 2025-01-25 PROCEDURE — 71275 CT ANGIOGRAPHY CHEST: CPT

## 2025-01-25 PROCEDURE — 83690 ASSAY OF LIPASE: CPT | Performed by: EMERGENCY MEDICINE

## 2025-01-25 PROCEDURE — 85014 HEMATOCRIT: CPT | Performed by: EMERGENCY MEDICINE

## 2025-01-25 PROCEDURE — 80053 COMPREHEN METABOLIC PANEL: CPT | Performed by: EMERGENCY MEDICINE

## 2025-01-25 PROCEDURE — 82310 ASSAY OF CALCIUM: CPT | Performed by: EMERGENCY MEDICINE

## 2025-01-25 PROCEDURE — 85004 AUTOMATED DIFF WBC COUNT: CPT | Performed by: EMERGENCY MEDICINE

## 2025-01-25 PROCEDURE — 82248 BILIRUBIN DIRECT: CPT | Performed by: EMERGENCY MEDICINE

## 2025-01-25 PROCEDURE — 96374 THER/PROPH/DIAG INJ IV PUSH: CPT | Mod: 59

## 2025-01-25 PROCEDURE — 74174 CTA ABD&PLVS W/CONTRAST: CPT

## 2025-01-25 PROCEDURE — 81001 URINALYSIS AUTO W/SCOPE: CPT | Performed by: EMERGENCY MEDICINE

## 2025-01-25 PROCEDURE — 250N000011 HC RX IP 250 OP 636: Performed by: EMERGENCY MEDICINE

## 2025-01-25 PROCEDURE — 85048 AUTOMATED LEUKOCYTE COUNT: CPT | Performed by: EMERGENCY MEDICINE

## 2025-01-25 PROCEDURE — 99285 EMERGENCY DEPT VISIT HI MDM: CPT | Mod: 25

## 2025-01-25 RX ORDER — IOPAMIDOL 755 MG/ML
90 INJECTION, SOLUTION INTRAVASCULAR ONCE
Status: COMPLETED | OUTPATIENT
Start: 2025-01-25 | End: 2025-01-25

## 2025-01-25 RX ORDER — MORPHINE SULFATE 4 MG/ML
4 INJECTION, SOLUTION INTRAMUSCULAR; INTRAVENOUS ONCE
Status: COMPLETED | OUTPATIENT
Start: 2025-01-25 | End: 2025-01-25

## 2025-01-25 RX ORDER — ONDANSETRON 2 MG/ML
4 INJECTION INTRAMUSCULAR; INTRAVENOUS ONCE
Status: COMPLETED | OUTPATIENT
Start: 2025-01-25 | End: 2025-01-25

## 2025-01-25 RX ADMIN — MORPHINE SULFATE 4 MG: 4 INJECTION, SOLUTION INTRAMUSCULAR; INTRAVENOUS at 06:51

## 2025-01-25 RX ADMIN — IOPAMIDOL 90 ML: 755 INJECTION, SOLUTION INTRAVENOUS at 08:36

## 2025-01-25 ASSESSMENT — ENCOUNTER SYMPTOMS
VOMITING: 0
FLANK PAIN: 1
SHORTNESS OF BREATH: 0
FEVER: 0
ABDOMINAL PAIN: 1
COUGH: 0
DYSURIA: 0
BACK PAIN: 1
DIARRHEA: 0
NAUSEA: 0

## 2025-01-25 ASSESSMENT — COLUMBIA-SUICIDE SEVERITY RATING SCALE - C-SSRS
2. HAVE YOU ACTUALLY HAD ANY THOUGHTS OF KILLING YOURSELF IN THE PAST MONTH?: NO
1. IN THE PAST MONTH, HAVE YOU WISHED YOU WERE DEAD OR WISHED YOU COULD GO TO SLEEP AND NOT WAKE UP?: NO
6. HAVE YOU EVER DONE ANYTHING, STARTED TO DO ANYTHING, OR PREPARED TO DO ANYTHING TO END YOUR LIFE?: NO

## 2025-01-25 ASSESSMENT — ACTIVITIES OF DAILY LIVING (ADL)
ADLS_ACUITY_SCORE: 41

## 2025-01-25 NOTE — ED PROVIDER NOTES
EMERGENCY DEPARTMENT ENCOUNTER      NAME: Sylwia Phelps  AGE: 59 year old female  YOB: 1965  MRN: [id number]  EVALUATION DATE & TIME: 1/25/2025  6:23 AM    PCP: Ros Chaudhary    ED PROVIDER: Kayleigh Cool M.D.        Chief Complaint   Patient presents with    Back Pain    Flank Pain         FINAL IMPRESSION:    1. Right flank pain            MEDICAL DECISION MAKING:    Sylwia Phelps is a 59 year old female with history of disease, obesity, hypertension who presents to the ER with complaints of right flank pain.    It has been going on and off now for about 2 days.  She thinks that sometimes it may be worse with movements, twisting, but also feels like it improves with standing.  Worse when she lies down.  She reports the pain to be the posterior back/right flank that slightly radiates to the right upper quadrant.  Denies any midline back pain.  Denies any injuries.    Exam overall pretty benign but does have a little discomfort in the right lateral spine/flank area.  CT imaging and laboratories look good.  Pain improved with pain control.  At this time I feel the patient can be discharged home to try OTC medications and things like Lidoderm patches.  She will follow-up with primary care symptoms persist or return to ER if anything worsens.      ED COURSE:  6:27 AM  I met with the patient to gather history and perform my exam. ED course and treatment discussed.    10:13 AM  Updated patient on all results.  She states her pain is better controlled.  Neuroexam unremarkable.  She states she has known about some arthritis in her spine and maybe even some bulging disks from prior orthopedic evaluation.  She does not have any neurologic changes or red flags to suggest cauda equina, etc.  Do not think she requires emergent MRI.  CT of the abdomen otherwise unremarkable.  This may represent more of a musculoskeletal etiology and patient feels comfortable with discharge home to do OTC  "medications such as Tylenol and ibuprofen.  She declined a prescription to try a short course of steroid which I think is reasonable.  Care not improving or return to the ER if anything worsens.  No red flags to suggest something like cauda equina.  Exam overall quite benign.    I do not think that this represents ACS, PE, ruptured AAA, aortic dissection, bowel obstruction, bowel ischemia, cholecystitis, pancreatitis, appendicitis, diverticulitis, kidney stone, pyelonephritis, incarcerated or strangulated hernia, ovarian/testicular torsion, PID, ectopic pregnancy, tubo-ovarian abscess, viscus perforation, perforated GI ulcer, cauda equina syndrome, discitis, epidural abscess, ovarian torsion, or other such etiologies at this time.      At the conclusion of the encounter I discussed the results of all of the tests and the disposition. Their questions were answered. The patient (and any family present) acknowledged understanding and were agreeable with the care plan.      CONSULTANTS:  none      MEDICATIONS GIVEN IN THE EMERGENCY:  Medications   ondansetron (ZOFRAN) injection 4 mg (4 mg Intravenous Not Given 1/25/25 5622)   morphine (PF) injection 4 mg (4 mg Intravenous $Given 1/25/25 0651)   iopamidol (ISOVUE-370) solution 90 mL (90 mLs Intravenous $Given 1/25/25 0836)           NEW PRESCRIPTIONS STARTED AT TODAY'S ER VISIT     Medication List      There are no discharge medications for this visit.             CONDITION:  Stable, improved        DISPOSITION:  D.c home         =================================================================  =================================================================  TRIAGE ASSESSMENT:  Right flank pain for 3 days worse last 2 nights. Pain comes in waves and \"pulses\" per pt report. No hx of kidney stones in past. No urinary s/s. Pain not change with movement but improves with standing at times     Triage Assessment (Adult)       Row Name 01/25/25 0621          Triage " Assessment    Airway WDL WDL        Respiratory WDL    Respiratory WDL WDL        Skin Circulation/Temperature WDL    Skin Circulation/Temperature WDL WDL        Cardiac WDL    Cardiac WDL WDL        Peripheral/Neurovascular WDL    Peripheral Neurovascular WDL WDL        Cognitive/Neuro/Behavioral WDL    Cognitive/Neuro/Behavioral WDL WDL                          ED Triage Vitals [01/25/25 0620]   Encounter Vitals Group      /68      Systolic BP Percentile       Diastolic BP Percentile       Pulse (!) 108      Resp 18      Temp 97  F (36.1  C)      Temp src       SpO2 98 %      Weight 102.1 kg (225 lb)      Height 1.829 m (6')       ================================================================  ================================================================    HPI    Patient information was obtained from: patient    Use of Intrepreter: N/A     Sylwia Phelps is a 59 year old female with history of disease, obesity, hypertension who presents to the ER with complaints of right flank pain.    It has been going on and off now for about 2 days.  She thinks that sometimes it may be worse with movements, twisting, but also feels like it improves with standing.  Worse when she lies down.  She reports the pain to be the posterior back/right flank that slightly radiates to the right upper quadrant.  Denies any midline back pain.  Denies any injuries.    She has not tried any oral medications for it.  She did try a lidoderm patch without relief.    Otherwise denies fevers, cough, chest pain, shortness of breath, vomiting, diarrhea, dysuria, hematuria, leg pain/numbness/weakness.    She has never had pain like this before.  No history of kidney stones.      CHART REVIEW:  Chart review shows an ER note from December 18, 2024 at which time she was having some leg pain that was thought to be radicular in nature.  She was referred to neurology and her orthopedic surgeon.      REVIEW OF SYSTEMS  Review of Systems    Constitutional:  Negative for fever.   Respiratory:  Negative for cough and shortness of breath.    Cardiovascular:  Negative for chest pain.   Gastrointestinal:  Positive for abdominal pain. Negative for diarrhea, nausea and vomiting.   Genitourinary:  Positive for flank pain. Negative for dysuria.   Musculoskeletal:  Positive for back pain.   All other systems reviewed and are negative.        PAST MEDICAL HISTORY:  No past medical history on file.      PAST SURGICAL HISTORY:  No past surgical history on file.      CURRENT MEDICATIONS:    Prior to Admission medications    Medication Sig Start Date End Date Taking? Authorizing Provider   levothyroxine (SYNTHROID) 100 MCG tablet [LEVOTHYROXINE (SYNTHROID) 100 MCG TABLET] TAKE 1 TABLET BY MOUTH EVERY DAY 5/26/20   Provider, Historical         ALLERGIES:  No Known Allergies      FAMILY HISTORY:  No family history on file.      SOCIAL HISTORY:  Social History     Socioeconomic History    Marital status:    Tobacco Use    Smoking status: Never    Smokeless tobacco: Never     Social Drivers of Health     Financial Resource Strain: Not At Risk (5/23/2024)    Received from [facility]    Financial Resource Strain     Is it hard for you to pay for the very basics like food, housing, medical care or heating?: No   Food Insecurity: Not At Risk (5/23/2024)    Received from [facility]    Food Insecurity     Does your food run out before you have the money to buy more?: No   Transportation Needs: Not At Risk (5/23/2024)    Received from [facility]    Transportation Needs     Does a lack of transportation keep you from your medical appointments or from getting your medications?: No         VITALS:  Patient Vitals for the past 24 hrs:   BP Temp Pulse Resp SpO2 Height Weight   01/25/25 1000 108/63 -- 76 -- 97 % -- --   01/25/25 0956 -- -- 73 -- 98 % -- --   01/25/25 0756 114/66 -- 74 -- 94 % -- --   01/25/25 0714 -- -- 86 -- 95 % -- --   01/25/25 0705 110/71 --  -- -- -- -- --   01/25/25 0620 133/68 97  F (36.1  C) (!) 108 18 98 % 1.829 m (6') 102.1 kg (225 lb)       Wt Readings from Last 3 Encounters:   01/25/25 102.1 kg (225 lb)   12/18/24 103 kg (227 lb 1.2 oz)       Estimated Creatinine Clearance: 87.4 mL/min (based on SCr of 0.95 mg/dL).    PHYSICAL EXAM    Constitutional:  Well developed, Well nourished, NAD  HENT:  Normocephalic, Atraumatic, Bilateral external ears normal, Nose normal. Neck- Supple, No stridor.   Eyes:  PERRL, EOMI, Conjunctiva normal, No discharge.  Respiratory:  Normal breath sounds, No respiratory distress, No wheezing, Speaks full sentences easily. No cough.   Cardiovascular:  Normal heart rate, Regular rhythm, No murmurs , No rubs, No gallops.   GI:  +obesity.  Bowel sounds normal, Soft, + slight RUQ tenderness, No masses, No rebound or guarding.   : deferred  Musculoskeletal: No cyanosis, No clubbing. Good range of motion in all major joints. No tenderness to palpation or major deformities noted. N midline tenderness of the CTLS spine. +right lateral flank tenderness  Integument:  Warm, Dry, No erythema, No rash.  No petechiae.   Neurologic:  Alert & oriented x 3  Psychiatric:  Affect normal, Cooperative         LAB:  All pertinent labs reviewed and interpreted.  Recent Results (from the past 24 hours)   UA with Microscopic reflex to Culture    Collection Time: 01/25/25  6:53 AM    Specimen: Urine, Midstream   Result Value Ref Range    Color Urine Yellow Colorless, Straw, Light Yellow, Yellow    Appearance Urine Clear Clear    Glucose Urine Negative Negative mg/dL    Bilirubin Urine Negative Negative    Ketones Urine 10 (A) Negative mg/dL    Specific Gravity Urine 1.023 1.001 - 1.030    Blood Urine Negative Negative    pH Urine 5.5 5.0 - 7.0    Protein Albumin Urine Negative Negative mg/dL    Urobilinogen Urine <2.0 <2.0 mg/dL    Nitrite Urine Negative Negative    Leukocyte Esterase Urine Negative Negative    Mucus Urine Present (A) None Seen  "/LPF    RBC Urine 1 <=2 /HPF    WBC Urine 4 <=5 /HPF    Squamous Epithelials Urine <1 <=1 /HPF   Basic metabolic panel    Collection Time: 01/25/25  6:53 AM   Result Value Ref Range    Sodium 141 135 - 145 mmol/L    Potassium 4.2 3.4 - 5.3 mmol/L    Chloride 104 98 - 107 mmol/L    Carbon Dioxide (CO2) 25 22 - 29 mmol/L    Anion Gap 12 7 - 15 mmol/L    Urea Nitrogen 19.1 6.0 - 20.0 mg/dL    Creatinine 0.95 0.51 - 0.95 mg/dL    GFR Estimate 70 >60 mL/min/1.73m2    Calcium 10.5 (H) 8.8 - 10.4 mg/dL    Glucose 92 70 - 99 mg/dL   Hepatic function panel    Collection Time: 01/25/25  6:53 AM   Result Value Ref Range    Protein Total 8.0 6.4 - 8.3 g/dL    Albumin 4.9 3.5 - 5.2 g/dL    Bilirubin Total 0.7 <=1.2 mg/dL    Alkaline Phosphatase 54 40 - 150 U/L    AST 19 0 - 45 U/L    ALT 17 0 - 50 U/L    Bilirubin Direct <0.20 0.00 - 0.30 mg/dL   Lipase    Collection Time: 01/25/25  6:53 AM   Result Value Ref Range    Lipase 34 13 - 60 U/L   CBC with platelets and differential    Collection Time: 01/25/25  6:53 AM   Result Value Ref Range    WBC Count 9.1 4.0 - 11.0 10e3/uL    RBC Count 4.88 3.80 - 5.20 10e6/uL    Hemoglobin 14.2 11.7 - 15.7 g/dL    Hematocrit 42.8 35.0 - 47.0 %    MCV 88 78 - 100 fL    MCH 29.1 26.5 - 33.0 pg    MCHC 33.2 31.5 - 36.5 g/dL    RDW 12.6 10.0 - 15.0 %    Platelet Count 456 (H) 150 - 450 10e3/uL    % Neutrophils 63 %    % Lymphocytes 27 %    % Monocytes 7 %    % Eosinophils 3 %    % Basophils 1 %    % Immature Granulocytes 0 %    NRBCs per 100 WBC 0 <1 /100    Absolute Neutrophils 5.7 1.6 - 8.3 10e3/uL    Absolute Lymphocytes 2.4 0.8 - 5.3 10e3/uL    Absolute Monocytes 0.6 0.0 - 1.3 10e3/uL    Absolute Eosinophils 0.2 0.0 - 0.7 10e3/uL    Absolute Basophils 0.1 0.0 - 0.2 10e3/uL    Absolute Immature Granulocytes 0.0 <=0.4 10e3/uL    Absolute NRBCs 0.0 10e3/uL       No results found for: \"ABORH\"        RADIOLOGY:  Reviewed all pertinent imaging. Please see official radiology report.    CTA Chest " Abdomen Pelvis w Contrast   Final Result   IMPRESSION:   1.  No acute pathology identified in the chest, abdomen, or pelvis.   2.  Minimal calcified atheromatous plaque in the abdominal aorta. No evidence of dissection or significant stenosis.   3.  Few scattered colonic diverticula without active inflammation.               EKG:    none    PROCEDURES:  none    Medical Decision Making  Obtained supplemental history:Supplemental history obtained?: No  Reviewed external records: External records reviewed?: Documented in chart and Outpatient Record: see HPI  Care impacted by chronic illness:Documented in Chart  Did you consider but not order tests?: Work up considered but not performed and documented in chart, if applicable  Did you interpret images independently?: Independent interpretation of ECG and images noted in documentation, when applicable.  Consultation discussion with other provider:Did you involve another provider (consultant, , pharmacy, etc.)?: No  Discharge. No recommendations on prescription strength medication(s). I considered admission, but ultimately discharged patient imaging and laboratories reassuring, neuroexam reassuring.    MIPS: CT Angiogram:CT angiogram was ordered for reason(s) other than PE.      Kayleigh Cool M.D. [facility]  Emergency Medicine and Medical Toxicology  [facility] EMERGENCY DEPARTMENT  [facility] [facility] [address]  [phone]  Dept: [phone]           Kayleigh Cool MD  01/25/25 5448

## 2025-01-25 NOTE — DISCHARGE INSTRUCTIONS
The blood work and CT imaging all look reassuring.    It is possible this could still be musculoskeletal in nature.  You can continue to use a Lidoderm patch that you have, just following instructions on your box at home.    You can take Tylenol 650 mg every 4 hours as needed and can even combine that with ibuprofen 400 mg every 6 hours as needed for the pain.    Make an appointment to follow-up with family doctor later this week if symptoms are not improving.    Return to ER with worsening pain, new leg pain, leg numbness, or leg weakness, persistent abdominal pain, fever, or any other concerns.    Thank you for choosing [facility] Emergency Department.  It has been my pleasure caring for you today.     ~Dr. Silvina MD

## 2025-01-25 NOTE — ED TRIAGE NOTES
"Right flank pain for 3 days worse last 2 nights. Pain comes in waves and \"pulses\" per pt report. No hx of kidney stones in past. No urinary s/s. Pain not change with movement but improves with standing at times     Triage Assessment (Adult)       Row Name 01/25/25 0621          Triage Assessment    Airway WDL WDL        Respiratory WDL    Respiratory WDL WDL        Skin Circulation/Temperature WDL    Skin Circulation/Temperature WDL WDL        Cardiac WDL    Cardiac WDL WDL        Peripheral/Neurovascular WDL    Peripheral Neurovascular WDL WDL        Cognitive/Neuro/Behavioral WDL    Cognitive/Neuro/Behavioral WDL WDL                     "

## 2025-06-24 ENCOUNTER — APPOINTMENT (OUTPATIENT)
Dept: MRI IMAGING | Facility: HOSPITAL | Age: 60
End: 2025-06-24
Attending: EMERGENCY MEDICINE
Payer: COMMERCIAL

## 2025-06-24 ENCOUNTER — HOSPITAL ENCOUNTER (EMERGENCY)
Facility: HOSPITAL | Age: 60
Discharge: HOME | End: 2025-06-24
Attending: EMERGENCY MEDICINE | Admitting: EMERGENCY MEDICINE
Payer: COMMERCIAL

## 2025-06-24 ENCOUNTER — APPOINTMENT (OUTPATIENT)
Dept: RADIOLOGY | Facility: HOSPITAL | Age: 60
End: 2025-06-24
Attending: EMERGENCY MEDICINE
Payer: COMMERCIAL

## 2025-06-24 VITALS
BODY MASS INDEX: 26.44 KG/M2 | HEIGHT: 72 IN | TEMPERATURE: 98 F | RESPIRATION RATE: 20 BRPM | WEIGHT: 195.2 LBS | HEART RATE: 80 BPM | OXYGEN SATURATION: 98 % | DIASTOLIC BLOOD PRESSURE: 69 MMHG | SYSTOLIC BLOOD PRESSURE: 121 MMHG

## 2025-06-24 DIAGNOSIS — R07.9 CHEST PAIN, UNSPECIFIED TYPE: ICD-10-CM

## 2025-06-24 LAB
ANION GAP SERPL CALCULATED.3IONS-SCNC: 11 MMOL/L (ref 7–15)
BASOPHILS # BLD AUTO: 0.1 10E3/UL (ref 0–0.2)
BASOPHILS NFR BLD AUTO: 1 %
BUN SERPL-MCNC: 18.7 MG/DL (ref 6–20)
CALCIUM SERPL-MCNC: 9.7 MG/DL (ref 8.8–10.4)
CHLORIDE SERPL-SCNC: 106 MMOL/L (ref 98–107)
CREAT SERPL-MCNC: 0.92 MG/DL (ref 0.51–0.95)
EGFRCR SERPLBLD CKD-EPI 2021: 72 ML/MIN/1.73M2
EOSINOPHIL # BLD AUTO: 0.2 10E3/UL (ref 0–0.7)
EOSINOPHIL NFR BLD AUTO: 2 %
ERYTHROCYTE [DISTWIDTH] IN BLOOD BY AUTOMATED COUNT: 12.6 % (ref 10–15)
GLUCOSE SERPL-MCNC: 117 MG/DL (ref 70–99)
HCO3 SERPL-SCNC: 24 MMOL/L (ref 22–29)
HCT VFR BLD AUTO: 38.9 % (ref 35–47)
HGB BLD-MCNC: 12.7 G/DL (ref 11.7–15.7)
HOLD SPECIMEN: NORMAL
HOLD SPECIMEN: NORMAL
IMM GRANULOCYTES # BLD: 0 10E3/UL
IMM GRANULOCYTES NFR BLD: 0 %
LYMPHOCYTES # BLD AUTO: 1.8 10E3/UL (ref 0.8–5.3)
LYMPHOCYTES NFR BLD AUTO: 24 %
MCH RBC QN AUTO: 28.8 PG (ref 26.5–33)
MCHC RBC AUTO-ENTMCNC: 32.6 G/DL (ref 31.5–36.5)
MCV RBC AUTO: 88 FL (ref 78–100)
MONOCYTES # BLD AUTO: 0.3 10E3/UL (ref 0–1.3)
MONOCYTES NFR BLD AUTO: 5 %
NEUTROPHILS # BLD AUTO: 5.1 10E3/UL (ref 1.6–8.3)
NEUTROPHILS NFR BLD AUTO: 68 %
NRBC # BLD AUTO: 0 10E3/UL
NRBC BLD AUTO-RTO: 0 /100
PLATELET # BLD AUTO: 373 10E3/UL (ref 150–450)
POTASSIUM SERPL-SCNC: 3.9 MMOL/L (ref 3.4–5.3)
RBC # BLD AUTO: 4.41 10E6/UL (ref 3.8–5.2)
SODIUM SERPL-SCNC: 141 MMOL/L (ref 135–145)
TROPONIN T SERPL HS-MCNC: 7 NG/L
TROPONIN T SERPL HS-MCNC: 8 NG/L
WBC # BLD AUTO: 7.5 10E3/UL (ref 4–11)

## 2025-06-24 PROCEDURE — 85004 AUTOMATED DIFF WBC COUNT: CPT | Performed by: STUDENT IN AN ORGANIZED HEALTH CARE EDUCATION/TRAINING PROGRAM

## 2025-06-24 PROCEDURE — 70544 MR ANGIOGRAPHY HEAD W/O DYE: CPT

## 2025-06-24 PROCEDURE — 99285 EMERGENCY DEPT VISIT HI MDM: CPT | Mod: 25

## 2025-06-24 PROCEDURE — 250N000013 HC RX MED GY IP 250 OP 250 PS 637: Performed by: EMERGENCY MEDICINE

## 2025-06-24 PROCEDURE — 36415 COLL VENOUS BLD VENIPUNCTURE: CPT | Performed by: EMERGENCY MEDICINE

## 2025-06-24 PROCEDURE — 70553 MRI BRAIN STEM W/O & W/DYE: CPT

## 2025-06-24 PROCEDURE — A9585 GADOBUTROL INJECTION: HCPCS | Performed by: EMERGENCY MEDICINE

## 2025-06-24 PROCEDURE — 255N000002 HC RX 255 OP 636: Performed by: EMERGENCY MEDICINE

## 2025-06-24 PROCEDURE — 93005 ELECTROCARDIOGRAM TRACING: CPT | Performed by: EMERGENCY MEDICINE

## 2025-06-24 PROCEDURE — 71046 X-RAY EXAM CHEST 2 VIEWS: CPT

## 2025-06-24 PROCEDURE — 85025 COMPLETE CBC W/AUTO DIFF WBC: CPT | Performed by: EMERGENCY MEDICINE

## 2025-06-24 PROCEDURE — 84484 ASSAY OF TROPONIN QUANT: CPT | Performed by: EMERGENCY MEDICINE

## 2025-06-24 PROCEDURE — 70549 MR ANGIOGRAPH NECK W/O&W/DYE: CPT

## 2025-06-24 PROCEDURE — 80048 BASIC METABOLIC PNL TOTAL CA: CPT | Performed by: EMERGENCY MEDICINE

## 2025-06-24 PROCEDURE — 36415 COLL VENOUS BLD VENIPUNCTURE: CPT | Performed by: STUDENT IN AN ORGANIZED HEALTH CARE EDUCATION/TRAINING PROGRAM

## 2025-06-24 PROCEDURE — 93005 ELECTROCARDIOGRAM TRACING: CPT | Performed by: STUDENT IN AN ORGANIZED HEALTH CARE EDUCATION/TRAINING PROGRAM

## 2025-06-24 RX ORDER — ACETAMINOPHEN 325 MG/1
975 TABLET ORAL ONCE
Status: COMPLETED | OUTPATIENT
Start: 2025-06-24 | End: 2025-06-24

## 2025-06-24 RX ORDER — GADOBUTROL 604.72 MG/ML
10 INJECTION INTRAVENOUS ONCE
Status: COMPLETED | OUTPATIENT
Start: 2025-06-24 | End: 2025-06-24

## 2025-06-24 RX ADMIN — ACETAMINOPHEN 975 MG: 325 TABLET ORAL at 09:56

## 2025-06-24 RX ADMIN — GADOBUTROL 10 ML: 604.72 INJECTION INTRAVENOUS at 08:56

## 2025-06-24 ASSESSMENT — ACTIVITIES OF DAILY LIVING (ADL)
ADLS_ACUITY_SCORE: 41

## 2025-06-24 NOTE — ED PROVIDER NOTES
EMERGENCY DEPARTMENT ENCOUNTER      NAME: Sylwia Phelps  AGE: 60 year old female  YOB: 1965  MRN: [id number]  EVALUATION DATE & TIME: 6/24/2025  7:54 AM    PCP: Ros Chaudhary    ED PROVIDER: Ros Vickers M.D.      Chief Complaint   Patient presents with    Chest Pain    Head Pain          FINAL IMPRESSION:  1. Chest pain, unspecified type          ED COURSE & MEDICAL DECISION MAKING:    ED Course as of 06/24/25 1415   Tue Jun 24, 2025   0733 Pt with atypical low risk chest pain per HEART score with atypical improved headache without fever, meningismus or rash to suggest meningoenceaphlitis  / infectious pathology, neuro intact with NIHSS 0 with reported improved eyelid droop without pupil abnormalities, pending MRI brain to ensure no anomalies, pending troponin testing for ACS r/o  with troponin = 7, will repeat troponin 0841, cxr pending and will do cardiac telemetry   0928 Repeat troponin being drawn by RF   0947 MRI and MRA brain without acute anomalies reassuringly. Repeat troponin underway, patient requested a dose of tylenol   1026 Troponin 7 -> 8 therefore per high sensitivity troponin screening protocol, ACS is ruled out. CXR WNL. Valentinetent with atypical chest pressure while anxious about left eyelid droop now improved, she thinks may be 2/2 inflammation/rubbing during tree pollen season but no redness/warmth or persistent swellign present to suggest regional cellulitis or infection, now resolved and never diplopia or pupil anomalies, MRA without aneurysm or masses reassuringly. Patient feeling well with resolved chest discomfort sensation, neuro intact. Patient discharged after being provided with extensive anticipatory guidance and given return precautions, importance of PMD follow-up emphasized.        Pertinent Labs & Imaging studies reviewed. (See chart for details)    Medical Decision Making      Discharge. No recommendations on prescription strength medication(s). See  "documentation for any additional details.    MIPS (CTPE, Dental pain, Wilson, Sinusitis, Asthma/COPD, Head Trauma): Not Applicable    SEPSIS: None        At the conclusion of the encounter I discussed the results of all of the tests and the disposition. The questions were answered. The patient or family acknowledged understanding and was agreeable with the care plan.     MEDICATIONS GIVEN IN THE EMERGENCY:  Medications   gadobutrol (GADAVIST) injection 10 mL (10 mLs Intravenous $Given 6/24/25 1175)   acetaminophen (TYLENOL) tablet 975 mg (975 mg Oral $Given 6/24/25 0981)       NEW PRESCRIPTIONS STARTED AT TODAY'S ER VISIT  Discharge Medication List as of 6/24/2025 10:35 AM             =================================================================    HPI      Sylwia Phelps is a 60 year old female with PMHx of HTN and obesity who presents to the ED today via walk in for evaluation of chest pain.     Starting 6/19 on the way to work, patient reports feeling a warm sensation to the left side of her face and ear that resulted in droopiness of the left eye as she turned her head. She then notes having an \"excruciating\" sharp pain on the left side of her head that partially radiates to the right side. These symptoms resolved on Saturday (6/21). Then yesterday (6/23), she notes having difficulty with looking at her computer and was unable to concentrate due to the headache.    At 4 AM today, patient notes waking up to hearing her heart racing, though notes that she did not feel the sensation. While on her way to work (5:40 AM), she notes lightheadedness, dizziness, and persistent chest pain that radiated from the left chest wall to the central chest. Upon arrival to the ED, patient notes having 3/10 pain and notes feeling off. Also upon arrival, she notes a little bit of droopiness to her left eye. She denies numbness and tingling, vision changes, speech changes. She thinks that her eyes and pupils were round and " equal, but was not sure of what she was looking for. She takes levothyroxine as prescribed. She recently had her lisinopril lowered from 20 mg to 10 mg due to having low blood pressure from weight loss. Patient is a non smoker. Her mother had a heart attack in her 40s. There are no other complaints/concerns at this time.       REVIEW OF SYSTEMS   All other systems reviewed and are negative except as noted above in HPI.    PAST MEDICAL HISTORY:  History reviewed. No pertinent past medical history.    PAST SURGICAL HISTORY:  History reviewed. No pertinent surgical history.    CURRENT MEDICATIONS:    levothyroxine (SYNTHROID) 100 MCG tablet        ALLERGIES:  No Known Allergies    FAMILY HISTORY:  History reviewed. No pertinent family history.    SOCIAL HISTORY:   Social History     Socioeconomic History    Marital status:    Tobacco Use    Smoking status: Never    Smokeless tobacco: Never     Social Drivers of Health     Financial Resource Strain: Not At Risk (5/23/2024)    Received from [email or web address]    Financial Resource Strain     Is it hard for you to pay for the very basics like food, housing, medical care or heating?: No   Food Insecurity: Not At Risk (5/23/2024)    Received from [email or web address]    Food Insecurity     Does your food run out before you have the money to buy more?: No   Transportation Needs: Not At Risk (5/23/2024)    Received from [email or web address]    Transportation Needs     Does a lack of transportation keep you from your medical appointments or from getting your medications?: No       VITALS:  Patient Vitals for the past 24 hrs:   BP Temp Temp src Pulse Resp SpO2 Height Weight   06/24/25 1031 121/69 -- -- 80 -- 98 % -- --   06/24/25 0806 119/70 -- -- 80 -- 99 % -- --   06/24/25 0629 (!) 159/80 98  F (36.7  C) Temporal 93 20 100 % 1.829 m (6') 88.5 kg (195 lb 3.2 oz)       PHYSICAL EXAM    GENERAL: Awake, alert.  In no acute distress.   HEENT: Normocephalic, atraumatic.  Pupils equal, round  and reactive.  Conjunctiva normal.  EOMI.  NECK: No stridor or apparent deformity.  PULMONARY: Symmetrical breath sounds without distress.  Lungs clear to auscultation bilaterally without wheezes, rhonchi or rales.  CARDIO: Regular rate and rhythm.  No significant murmur, rub or gallop.  Radial pulses strong and symmetrical.  ABDOMINAL: Abdomen soft, non-distended and non-tender to palpation.  No CVAT, no palpable hepatosplenomegaly.  EXTREMITIES: No lower extremity swelling or edema.    NEURO: Alert and oriented to person, place and time.  Cranial nerves grossly intact.  No focal motor deficit.  PSYCH: Normal mood and affect  SKIN: No rashes      [employer] Stroke Scale  Exam Interval: Baseline   Score    Level of consciousness: (0)   Alert, keenly responsive    LOC questions: (0)   Answers both questions correctly    LOC commands: (0)   Performs both tasks correctly    Best gaze: (0)   Normal    Visual: (0)   No visual loss    Facial palsy: (0)   Normal symmetrical movements    Motor arm (left): (0)   No drift    Motor arm (right): (0)   No drift    Motor leg (left): (0)   No drift    Motor leg (right): (0)   No drift    Limb ataxia: (0)   Absent    Sensory: (0)   Normal- no sensory loss    Best language: (0)   Normal- no aphasia    Dysarthria: (0)   Normal    Extinction and inattention: (0)   No abnormality        Total Score:  0       LAB:  All pertinent labs reviewed and interpreted.  Results for orders placed or performed during the hospital encounter of 06/24/25   MR Brain w/o & w Contrast    Impression    IMPRESSION:  HEAD MRI:  No acute intracranial abnormality. No evidence of acute infarct, hemorrhage, or mass.    HEAD MRA:  1.  Patent intracranial arterial vasculature without flow-limiting stenosis.  2.  No aneurysm.    NECK MRA:  1.  There is a mildly undulating contour of the bilateral cervical ICAs, worse in the left, which could relate to fibromuscular dysplasia.  2.  Patent  cervical arterial vasculature without flow-limiting stenosis.     XR Chest 2 Views    Impression    IMPRESSION: Lungs are clear. No pleural effusion. Heart size and pulmonary vascularity within normal limits.   MRA Brain ([place]) wo Contrast    Impression    IMPRESSION:  HEAD MRI:  No acute intracranial abnormality. No evidence of acute infarct, hemorrhage, or mass.    HEAD MRA:  1.  Patent intracranial arterial vasculature without flow-limiting stenosis.  2.  No aneurysm.    NECK MRA:  1.  There is a mildly undulating contour of the bilateral cervical ICAs, worse in the left, which could relate to fibromuscular dysplasia.  2.  Patent cervical arterial vasculature without flow-limiting stenosis.     MRA Neck ([place]ot[place]) wo & w Contrast    Impression    IMPRESSION:  HEAD MRI:  No acute intracranial abnormality. No evidence of acute infarct, hemorrhage, or mass.    HEAD MRA:  1.  Patent intracranial arterial vasculature without flow-limiting stenosis.  2.  No aneurysm.    NECK MRA:  1.  There is a mildly undulating contour of the bilateral cervical ICAs, worse in the left, which could relate to fibromuscular dysplasia.  2.  Patent cervical arterial vasculature without flow-limiting stenosis.     Basic metabolic panel   Result Value Ref Range    Sodium 141 135 - 145 mmol/L    Potassium 3.9 3.4 - 5.3 mmol/L    Chloride 106 98 - 107 mmol/L    Carbon Dioxide (CO2) 24 22 - 29 mmol/L    Anion Gap 11 7 - 15 mmol/L    Urea Nitrogen 18.7 6.0 - 20.0 mg/dL    Creatinine 0.92 0.51 - 0.95 mg/dL    GFR Estimate 72 >60 mL/min/1.73m2    Calcium 9.7 8.8 - 10.4 mg/dL    Glucose 117 (H) 70 - 99 mg/dL   Result Value Ref Range    Troponin T, High Sensitivity 7 <=14 ng/L   CBC with platelets and differential   Result Value Ref Range    WBC Count 7.5 4.0 - 11.0 10e3/uL    RBC Count 4.41 3.80 - 5.20 10e6/uL    Hemoglobin 12.7 11.7 - 15.7 g/dL    Hematocrit 38.9 35.0 - 47.0 %    MCV 88 78 - 100 fL    MCH 28.8 26.5 - 33.0 pg    MCHC  32.6 31.5 - 36.5 g/dL    RDW 12.6 10.0 - 15.0 %    Platelet Count 373 150 - 450 10e3/uL    % Neutrophils 68 %    % Lymphocytes 24 %    % Monocytes 5 %    % Eosinophils 2 %    % Basophils 1 %    % Immature Granulocytes 0 %    NRBCs per 100 WBC 0 <1 /100    Absolute Neutrophils 5.1 1.6 - 8.3 10e3/uL    Absolute Lymphocytes 1.8 0.8 - 5.3 10e3/uL    Absolute Monocytes 0.3 0.0 - 1.3 10e3/uL    Absolute Eosinophils 0.2 0.0 - 0.7 10e3/uL    Absolute Basophils 0.1 0.0 - 0.2 10e3/uL    Absolute Immature Granulocytes 0.0 <=0.4 10e3/uL    Absolute NRBCs 0.0 10e3/uL   Extra Blue Top Tube   Result Value Ref Range    Hold Specimen JIC    Extra Red Top Tube   Result Value Ref Range    Hold Specimen JIC    Result Value Ref Range    Troponin T, High Sensitivity 8 <=14 ng/L       RADIOLOGY:  Reviewed all pertinent imaging. Please see official radiology report.  MR Brain w/o & w Contrast   Final Result   IMPRESSION:   HEAD MRI:   No acute intracranial abnormality. No evidence of acute infarct, hemorrhage, or mass.      HEAD MRA:   1.  Patent intracranial arterial vasculature without flow-limiting stenosis.   2.  No aneurysm.      NECK MRA:   1.  There is a mildly undulating contour of the bilateral cervical ICAs, worse in the left, which could relate to fibromuscular dysplasia.   2.  Patent cervical arterial vasculature without flow-limiting stenosis.         MRA Brain ([place]) wo Contrast   Final Result   IMPRESSION:   HEAD MRI:   No acute intracranial abnormality. No evidence of acute infarct, hemorrhage, or mass.      HEAD MRA:   1.  Patent intracranial arterial vasculature without flow-limiting stenosis.   2.  No aneurysm.      NECK MRA:   1.  There is a mildly undulating contour of the bilateral cervical ICAs, worse in the left, which could relate to fibromuscular dysplasia.   2.  Patent cervical arterial vasculature without flow-limiting stenosis.         MRA Neck (Carotids) wo & w Contrast   Final Result   IMPRESSION:    HEAD MRI:   No acute intracranial abnormality. No evidence of acute infarct, hemorrhage, or mass.      HEAD MRA:   1.  Patent intracranial arterial vasculature without flow-limiting stenosis.   2.  No aneurysm.      NECK MRA:   1.  There is a mildly undulating contour of the bilateral cervical ICAs, worse in the left, which could relate to fibromuscular dysplasia.   2.  Patent cervical arterial vasculature without flow-limiting stenosis.         XR Chest 2 Views   Final Result   IMPRESSION: Lungs are clear. No pleural effusion. Heart size and pulmonary vascularity within normal limits.          EKG:    Reviewed and interpreted as: N/A      I have independently reviewed and interpreted the EKG(s) documented above.        I, Lety Cherry, am serving as a scribe to document services personally performed by Dr. Ros Vickers based on my observation and the provider's statements to me. I, Ros Vickers MD attest that Lety Cherry is acting in a scribe capacity, has observed my performance of the services and has documented them in accordance with my direction.       Ros Vickers MD  06/24/25 [id number]

## 2025-06-24 NOTE — Clinical Note
Sylwia Phelps was seen and treated in our emergency department on 6/24/2025.  She may return to work on 06/25/2025.       If you have any questions or concerns, please don't hesitate to call.      Ros Vickers MD

## 2025-06-24 NOTE — ED TRIAGE NOTES
Pt arrives for eval of intermittent head pains and chest pressure that have been coming and going for the past couple of days. Currently only feels left sided chest pressure. Also reports some dizziness. No medications PTA.      Triage Assessment (Adult)       Row Name 06/24/25 0631          Triage Assessment    Airway WDL WDL        Respiratory WDL    Respiratory WDL WDL        Skin Circulation/Temperature WDL    Skin Circulation/Temperature WDL WDL        Cardiac WDL    Cardiac WDL X;chest pain        Chest Pain Assessment    Chest Pain Location anterior chest, left     Character aching;pressure        Peripheral/Neurovascular WDL    Peripheral Neurovascular WDL WDL        Cognitive/Neuro/Behavioral WDL    Cognitive/Neuro/Behavioral WDL WDL

## 2025-06-25 LAB
ATRIAL RATE - MUSE: 96 BPM
DIASTOLIC BLOOD PRESSURE - MUSE: NORMAL MMHG
INTERPRETATION ECG - MUSE: NORMAL
P AXIS - MUSE: 60 DEGREES
PR INTERVAL - MUSE: 134 MS
QRS DURATION - MUSE: 74 MS
QT - MUSE: 358 MS
QTC - MUSE: 452 MS
R AXIS - MUSE: 71 DEGREES
SYSTOLIC BLOOD PRESSURE - MUSE: NORMAL MMHG
T AXIS - MUSE: 62 DEGREES
VENTRICULAR RATE- MUSE: 96 BPM